# Patient Record
(demographics unavailable — no encounter records)

---

## 2024-11-12 NOTE — REASON FOR VISIT
[Follow-Up Visit] : a follow-up visit for [Workers' Comp: Date of Injury: _____] : This visit is related to worker's compensation. Date of Injury: [unfilled] [FreeTextEntry2] : Right THR  1/3/22

## 2024-11-12 NOTE — HISTORY OF PRESENT ILLNESS
[de-identified] : Patient presents today for follow-up of right hip pain that he was having for an extended period of time..  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 The patient reports of an injury sustained on July 3, 2024 while at work swinging a sledgehammer.  Since that last visit, the patient has been prescribed and taken a Medrol Dosepak as well as diclofenac.  He states with these medications his right hip pain is improved.  He denies of any fevers or chills.  No acute distal motor or sensory changes are reported. He has not had any dislocations or new injuries since his last visit.  He does intermittently use a cane when ambulating long distances.  He reports of an ultrasound-guided aspiration as part of his treatment.  We did try to send him for an MRI however he did not tolerate the procedure.  Patient noted to have significant relief over the past several weeks.  Patient would like to return to work.  Review of Systems- Constitutional: No fever or chills.  Cardiovascular: No orthopnea or chest pain Pulmonary: No shortness of breath.  GI: No nausea or vomiting or abdominal pain. Musculoskeletal: see HPI  Psychiatric: No anxiety and depression.

## 2024-11-12 NOTE — RETURN TO WORK/SCHOOL
[FreeTextEntry1] : Patient was seen and examined today for his left hip. He may return to full duty work with no restrictions on Monday November 18, 2024. [FreeTextEntry2] : Dr. Derek Kinsey

## 2024-11-12 NOTE — DISCUSSION/SUMMARY
[Medication Risks Reviewed] : Medication risks reviewed [de-identified] : 25 minutes was spent ordering tests, reviewing past office notes, examining the patient, discussing the clinical presentation and findings, communicating and explaining the diagnosis, ordering medication, providing orthopedic education and documenting the visit in the electronic medical record.t.  The patient is feeling markedly better over the past several weeks than he has in a very long time.  Patient would like to return to work next week full duty without any restrictions.  He will continue an exercise program of walking and strength training and follow-up as needed.  All of his questions were answered to his satisfaction.

## 2024-11-12 NOTE — PHYSICAL EXAM
[UE/LE] : Sensory: Intact in bilateral upper & lower extremities [ALL] : dorsalis pedis, posterior tibial, femoral, popliteal, and radial 2+ and symmetric bilaterally [Normal RUE] : Right Upper Extremity: No scars, rashes, lesions, ulcers, skin intact [Normal LUE] : Left Upper Extremity: No scars, rashes, lesions, ulcers, skin intact [Normal RLE] : Right Lower Extremity: No scars, rashes, lesions, ulcers, skin intact [Normal LLE] : Left Lower Extremity: No scars, rashes, lesions, ulcers, skin intact [Normal] : No costovertebral angle tenderness and no spinal tenderness [de-identified] : The patient is conversive and in no apparent distress. The patient is alert and oriented to person, place, and time. Affect and mood appear normal. The head is normocephalic and atraumatic. Skin shows normal turgor with no evidence of eczema or psoriasis. No respiratory distress noted. Sensation grossly intact. MUSCULOSKELETAL:   SEE BELOW  In the supine position, leg lengths are equal.  The patient is able to stand and sit with minimal discomfort.  No assistance is required.  Active hip flexion is 60 degrees.  Passive hip flexion is near 80 degrees.  External rotation of 40 degrees.  Internal rotation of 5 degrees.  Hip abduction is 35 degrees.  Hip adduction is 7 degrees.  5/5 motor strength of the hips against resistance.  No focal weakness noted.  No specific tenderness to the anterior lateral hip to palpation is noted.  Sensation distally intact to light touch. [de-identified] : Orion bhardwaj [de-identified] : 2 view right hip and pelvis x-ray was reviewed.  Hip implants continue to remain unchanged from previous x-ray.  No acute fracture or lytic lesions.

## 2025-01-08 NOTE — PHYSICAL EXAM
[Antalgic] : antalgic [LE] : Sensory: Intact in bilateral lower extremities [ALL] : dorsalis pedis, posterior tibial, femoral, popliteal, and radial 2+ and symmetric bilaterally [de-identified] : On physical examination of the right hip.  Patient is status post revision of a right total hip replacement.  There is a well-healed surgical scar with no evidence of infection superficial or deep.  There is no redness swelling heat discharge fever noted.  However there is some diffuse pain and tenderness which is mostly noted on the anterior aspect.  He does have a decreased range of motion when compared to the opposite side.  As this was evaluated 3 times using a GYN meter.  He is able to flex his hip to approximately 100 degrees.  He is able to fully extend the hip.  Internally rotate to approximately 15 degrees with external rotation of approximately 25 degrees.  He is able to adduct and abduct both approximately 15 degrees.  This is less in comparison when comparing the opposite side.  There is also some pain and decreased strength with hip flexion and abduction against resistance in comparison to the opposite leg. There is no palpable defect noted.  He is neurovascularly intact with no motor or sensory deficit.  No evidence of limb length discrepancy.  No evidence of any muscle atrophy.  No evidence of any calf tenderness.  Patient has good distal pulses.   When comparing to the opposite side it is noted that he is able to flex his left hip to approximately 115 degrees.  Full extension to 0.  Abduct to approximately 30 degrees and abduct to approximately 40 degrees.  In terminal rotation is approximately 35 degrees with external rotation of approximately 45 degrees. [de-identified] : No x-rays were taken in today's office visit

## 2025-01-08 NOTE — DISCUSSION/SUMMARY
[Medication Risks Reviewed] : Medication risks reviewed [Surgical risks reviewed] : Surgical risks reviewed [de-identified] : After thorough history full examination and review of the previous x-rays.  The patient is assured that he is doing well following his revision of his right total hip replacement.  He was explained that the prosthesis is in good alignment with fixation.  However he did have a revision of his hip which was aggravated following this condition at work.  Through the guidelines of [facility] and examining his hip using a GYN meter with the range of motion.  It is noted that the patient does have a generalized decreased range of motion.  There is no and addition to muscle atrophy nor limb length discrepancy.  Therefore the total for his percentage loss would equal 35% according to the Worker's Comp. table on page 39.  Currently the patient is working and continue to work at his present job.  He is advised to continue with an exercise program along with ice packs/moist heat and anti-inflammatories as needed.  Patient will return back to the office in another 2 to 3 months for reevaluation.  35 minutes were spent, face to face, in direct consultation with the patient. This includes reviewing the natural history of their Dx., eliciting the history, performing an orthopedic exam, review of the x-ray findings, forming a differential Dx and discussing all treatment options. This Includes both surgical and non-surgical treatments. I also reviewed all the risks and benefits of non-operative & operative Tx options, future impact into orthopedic functions/problems, activity restrictions both at home and at work, and all follow up requirements.

## 2025-01-08 NOTE — DISCUSSION/SUMMARY
[Medication Risks Reviewed] : Medication risks reviewed [Surgical risks reviewed] : Surgical risks reviewed [de-identified] : After thorough history full examination and review of the previous x-rays.  The patient is assured that he is doing well following his revision of his right total hip replacement.  He was explained that the prosthesis is in good alignment with fixation.  However he did have a revision of his hip which was aggravated following this condition at work.  Through the guidelines of [facility] and examining his hip using a GYN meter with the range of motion.  It is noted that the patient does have a generalized decreased range of motion.  There is no and addition to muscle atrophy nor limb length discrepancy.  Therefore the total for his percentage loss would equal 35% according to the Worker's Comp. table on page 39.  Currently the patient is working and continue to work at his present job.  He is advised to continue with an exercise program along with ice packs/moist heat and anti-inflammatories as needed.  Patient will return back to the office in another 2 to 3 months for reevaluation.  35 minutes were spent, face to face, in direct consultation with the patient. This includes reviewing the natural history of their Dx., eliciting the history, performing an orthopedic exam, review of the x-ray findings, forming a differential Dx and discussing all treatment options. This Includes both surgical and non-surgical treatments. I also reviewed all the risks and benefits of non-operative & operative Tx options, future impact into orthopedic functions/problems, activity restrictions both at home and at work, and all follow up requirements.

## 2025-01-08 NOTE — HISTORY OF PRESENT ILLNESS
[de-identified] : Patient is a 52-year-old male who presents today for an evaluation regarding his right hip.  He is status post revision of a right total hip replacement performed by Dr. Paredes on January 3, 2022.  He states initially he did undergo surgical correction which had failed and therefore required a revision.  Following the revision he states that he was doing fairly well with less pain and discomfort.  But unfortunately while at work on July 3, 2024.  He sustained an injury aggravating his right hip.  He states that he since has gone back to work on and off and does feel residual discomfort.  This is mostly with any strenuous activities but also during prolonged sitting.  Such as when driving his vehicle.  He also states that he has difficulty with certain daily activities such as hip flexion or when trying to apply his sock.  He states edema still use the sock polar.  He states that there is a clicking sensation with the slight numbness and tingling with a discomfort on and off which is more anterior.  He is currently working at the present time.  But states that at certain times there is an exacerbation of his condition.  As there is a decrease in his range of motion.  He denies any new or recent injury.  Presents today for an evaluation of his right hip.

## 2025-01-08 NOTE — PHYSICAL EXAM
[Antalgic] : antalgic [LE] : Sensory: Intact in bilateral lower extremities [ALL] : dorsalis pedis, posterior tibial, femoral, popliteal, and radial 2+ and symmetric bilaterally [de-identified] : On physical examination of the right hip.  Patient is status post revision of a right total hip replacement.  There is a well-healed surgical scar with no evidence of infection superficial or deep.  There is no redness swelling heat discharge fever noted.  However there is some diffuse pain and tenderness which is mostly noted on the anterior aspect.  He does have a decreased range of motion when compared to the opposite side.  As this was evaluated 3 times using a GYN meter.  He is able to flex his hip to approximately 100 degrees.  He is able to fully extend the hip.  Internally rotate to approximately 15 degrees with external rotation of approximately 25 degrees.  He is able to adduct and abduct both approximately 15 degrees.  This is less in comparison when comparing the opposite side.  There is also some pain and decreased strength with hip flexion and abduction against resistance in comparison to the opposite leg. There is no palpable defect noted.  He is neurovascularly intact with no motor or sensory deficit.  No evidence of limb length discrepancy.  No evidence of any muscle atrophy.  No evidence of any calf tenderness.  Patient has good distal pulses.   When comparing to the opposite side it is noted that he is able to flex his left hip to approximately 115 degrees.  Full extension to 0.  Abduct to approximately 30 degrees and abduct to approximately 40 degrees.  In terminal rotation is approximately 35 degrees with external rotation of approximately 45 degrees. [de-identified] : No x-rays were taken in today's office visit

## 2025-01-08 NOTE — PHYSICAL EXAM
[Antalgic] : antalgic [LE] : Sensory: Intact in bilateral lower extremities [ALL] : dorsalis pedis, posterior tibial, femoral, popliteal, and radial 2+ and symmetric bilaterally [de-identified] : On physical examination of the right hip.  Patient is status post revision of a right total hip replacement.  There is a well-healed surgical scar with no evidence of infection superficial or deep.  There is no redness swelling heat discharge fever noted.  However there is some diffuse pain and tenderness which is mostly noted on the anterior aspect.  He does have a decreased range of motion when compared to the opposite side.  As this was evaluated 3 times using a GYN meter.  He is able to flex his hip to approximately 100 degrees.  He is able to fully extend the hip.  Internally rotate to approximately 15 degrees with external rotation of approximately 25 degrees.  He is able to adduct and abduct both approximately 15 degrees.  This is less in comparison when comparing the opposite side.  There is also some pain and decreased strength with hip flexion and abduction against resistance in comparison to the opposite leg. There is no palpable defect noted.  He is neurovascularly intact with no motor or sensory deficit.  No evidence of limb length discrepancy.  No evidence of any muscle atrophy.  No evidence of any calf tenderness.  Patient has good distal pulses.   When comparing to the opposite side it is noted that he is able to flex his left hip to approximately 115 degrees.  Full extension to 0.  Abduct to approximately 30 degrees and abduct to approximately 40 degrees.  In terminal rotation is approximately 35 degrees with external rotation of approximately 45 degrees. [de-identified] : No x-rays were taken in today's office visit

## 2025-01-08 NOTE — HISTORY OF PRESENT ILLNESS
[de-identified] : Patient is a 52-year-old male who presents today for an evaluation regarding his right hip.  He is status post revision of a right total hip replacement performed by Dr. Paredes on January 3, 2022.  He states initially he did undergo surgical correction which had failed and therefore required a revision.  Following the revision he states that he was doing fairly well with less pain and discomfort.  But unfortunately while at work on July 3, 2024.  He sustained an injury aggravating his right hip.  He states that he since has gone back to work on and off and does feel residual discomfort.  This is mostly with any strenuous activities but also during prolonged sitting.  Such as when driving his vehicle.  He also states that he has difficulty with certain daily activities such as hip flexion or when trying to apply his sock.  He states edema still use the sock polar.  He states that there is a clicking sensation with the slight numbness and tingling with a discomfort on and off which is more anterior.  He is currently working at the present time.  But states that at certain times there is an exacerbation of his condition.  As there is a decrease in his range of motion.  He denies any new or recent injury.  Presents today for an evaluation of his right hip.

## 2025-01-08 NOTE — DISCUSSION/SUMMARY
[Medication Risks Reviewed] : Medication risks reviewed [Surgical risks reviewed] : Surgical risks reviewed [de-identified] : After thorough history full examination and review of the previous x-rays.  The patient is assured that he is doing well following his revision of his right total hip replacement.  He was explained that the prosthesis is in good alignment with fixation.  However he did have a revision of his hip which was aggravated following this condition at work.  Through the guidelines of [facility] and examining his hip using a GYN meter with the range of motion.  It is noted that the patient does have a generalized decreased range of motion.  There is no and addition to muscle atrophy nor limb length discrepancy.  Therefore the total for his percentage loss would equal 35% according to the Worker's Comp. table on page 39.  Currently the patient is working and continue to work at his present job.  He is advised to continue with an exercise program along with ice packs/moist heat and anti-inflammatories as needed.  Patient will return back to the office in another 2 to 3 months for reevaluation.  35 minutes were spent, face to face, in direct consultation with the patient. This includes reviewing the natural history of their Dx., eliciting the history, performing an orthopedic exam, review of the x-ray findings, forming a differential Dx and discussing all treatment options. This Includes both surgical and non-surgical treatments. I also reviewed all the risks and benefits of non-operative & operative Tx options, future impact into orthopedic functions/problems, activity restrictions both at home and at work, and all follow up requirements.

## 2025-01-08 NOTE — HISTORY OF PRESENT ILLNESS
[de-identified] : Patient is a 52-year-old male who presents today for an evaluation regarding his right hip.  He is status post revision of a right total hip replacement performed by Dr. Paredes on January 3, 2022.  He states initially he did undergo surgical correction which had failed and therefore required a revision.  Following the revision he states that he was doing fairly well with less pain and discomfort.  But unfortunately while at work on July 3, 2024.  He sustained an injury aggravating his right hip.  He states that he since has gone back to work on and off and does feel residual discomfort.  This is mostly with any strenuous activities but also during prolonged sitting.  Such as when driving his vehicle.  He also states that he has difficulty with certain daily activities such as hip flexion or when trying to apply his sock.  He states edema still use the sock polar.  He states that there is a clicking sensation with the slight numbness and tingling with a discomfort on and off which is more anterior.  He is currently working at the present time.  But states that at certain times there is an exacerbation of his condition.  As there is a decrease in his range of motion.  He denies any new or recent injury.  Presents today for an evaluation of his right hip.

## 2025-01-08 NOTE — REASON FOR VISIT
[Follow-Up Visit] : a follow-up visit for [Workers' Comp: Date of Injury: _____] : This visit is related to worker's compensation. Date of Injury: [unfilled] [FreeTextEntry2] :  Follow up of right total hip replacement DOS;1/3/22

## 2025-07-18 NOTE — PROCEDURE
[FreeTextEntry1] : - After a discussion of risks and benefits, the patient agreed to proceed with a cortisone injection. - Side: Right lateral joint line. - Medications injected: 2 cc of 1% Lidocaine and 1 cc of Celestone Soluspan, 6mg/cc, using sterile technique. - Ultrasound guidance: Ultrasound guidance was used, for diagnostic and therapeutic purposes. to properly localized the needle in the elbow radiocapitellar joint, prior to the injection. - Patient tolerated the procedure well, without complications. - Patient was told that the pain usually worsens for a day or two, and should then begin to improve. - Instructions: Patient was instructed on the use of ice, Tylenol, and activity modification. - Follow-up: If his symptoms persist or recur in the future.

## 2025-07-18 NOTE — PHYSICAL EXAM
[de-identified] : - Constitutional: This is a male in no obvious distress.   - Psych: Patient is alert and oriented to person, place and time.  Patient has a normal mood and affect. - Cardiovascular: Normal pulses throughout the upper extremities.  No significant varicosities are noted in the upper extremities.   ---   Examination of his right elbow demonstrates no obvious swelling.  There is evidence of some distal triceps atrophy.  There is tenderness along the lateral joint line and there is clicking with flexion and extension of the elbow at the radiocapitellar joint.  There appears to be clicking overlying the radial head, in the region of the annular ligament.  There is no obvious posterolateral instability.  There is no medial swelling or tenderness.  He is neurovascularly intact distally. [de-identified] : I reviewed an ultrasound of his right elbow dated 6/14/2023.  This demonstrated:  Moderate tendinosis of the common extensor tendon origin. Mild undersurface tearing at the humeral origin of the lateral ulnar collateral ligament. Small joint effusion along the posterior lateral joint recess. Mild thickening of the overlying postero-lateral joint capsule which appears to impinge between the joint with dynamic maneuvers resulting in scalloping. Moderate tendinosis of the triceps tendon with bulky enthesophyte formation.  Previous AP, lateral, and oblique radiographs of the right elbow demonstrated an olecranon bone spur and calcification in the distal triceps.  There is no obvious arthritis noted.

## 2025-07-18 NOTE — PHYSICAL EXAM
[de-identified] : - Constitutional: This is a male in no obvious distress.   - Psych: Patient is alert and oriented to person, place and time.  Patient has a normal mood and affect. - Cardiovascular: Normal pulses throughout the upper extremities.  No significant varicosities are noted in the upper extremities.   ---   Examination of his right elbow demonstrates no obvious swelling.  There is evidence of some distal triceps atrophy.  There is tenderness along the lateral joint line and there is clicking with flexion and extension of the elbow at the radiocapitellar joint.  There appears to be clicking overlying the radial head, in the region of the annular ligament.  There is no obvious posterolateral instability.  There is no medial swelling or tenderness.  He is neurovascularly intact distally. [de-identified] : I reviewed an ultrasound of his right elbow dated 6/14/2023.  This demonstrated:  Moderate tendinosis of the common extensor tendon origin. Mild undersurface tearing at the humeral origin of the lateral ulnar collateral ligament. Small joint effusion along the posterior lateral joint recess. Mild thickening of the overlying postero-lateral joint capsule which appears to impinge between the joint with dynamic maneuvers resulting in scalloping. Moderate tendinosis of the triceps tendon with bulky enthesophyte formation.  Previous AP, lateral, and oblique radiographs of the right elbow demonstrated an olecranon bone spur and calcification in the distal triceps.  There is no obvious arthritis noted.

## 2025-07-18 NOTE — HISTORY OF PRESENT ILLNESS
[FreeTextEntry1] : Follow-up regarding right elbow.  See note from when he was seen in the office 15 months ago.  He was given a cortisone injection in the region of the lateral joint line.  He returns today, for right elbow pain, which he rates as a 10/10. He has swelling and clicking, but denies any numbness/tingling. He states that the last cortisone injection did provide his symptoms with relief.   He has defibrillator. He was diagnosed with heart failure, which he developed due to issues with his initial right hip replacement, which was recalled due to metal on metal.